# Patient Record
Sex: MALE | Race: WHITE | Employment: FULL TIME | URBAN - METROPOLITAN AREA
[De-identification: names, ages, dates, MRNs, and addresses within clinical notes are randomized per-mention and may not be internally consistent; named-entity substitution may affect disease eponyms.]

---

## 2017-01-16 PROBLEM — M23.92 INTERNAL DERANGEMENT OF KNEE, LEFT: Status: ACTIVE | Noted: 2017-01-16

## 2017-01-24 PROBLEM — S83.412A SPRAIN OF MEDIAL COLLATERAL LIGAMENT OF LEFT KNEE, INITIAL ENCOUNTER: Status: ACTIVE | Noted: 2017-01-24

## 2017-03-20 PROBLEM — S83.412D SPRAIN OF MEDIAL COLLATERAL LIGAMENT OF LEFT KNEE, SUBSEQUENT ENCOUNTER: Status: ACTIVE | Noted: 2017-03-20

## 2018-01-12 PROBLEM — M50.10 HERNIATION OF CERVICAL INTERVERTEBRAL DISC WITH RADICULOPATHY: Status: ACTIVE | Noted: 2018-01-12

## 2018-01-12 PROBLEM — S39.012D LUMBAR SPINE STRAIN, SUBSEQUENT ENCOUNTER: Status: ACTIVE | Noted: 2018-01-12
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
## 2018-03-03 PROCEDURE — 84154 ASSAY OF PSA FREE: CPT | Performed by: INTERNAL MEDICINE

## 2018-03-03 PROCEDURE — 81003 URINALYSIS AUTO W/O SCOPE: CPT | Performed by: INTERNAL MEDICINE

## 2018-03-03 PROCEDURE — 83835 ASSAY OF METANEPHRINES: CPT | Performed by: INTERNAL MEDICINE

## 2018-03-03 PROCEDURE — 84153 ASSAY OF PSA TOTAL: CPT | Performed by: INTERNAL MEDICINE
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
## 2018-06-08 PROBLEM — M47.812 OSTEOARTHRITIS OF CERVICAL SPINE, UNSPECIFIED SPINAL OSTEOARTHRITIS COMPLICATION STATUS: Status: ACTIVE | Noted: 2018-06-08

## 2018-06-08 PROBLEM — M54.12 CERVICAL RADICULITIS: Status: ACTIVE | Noted: 2018-06-08

## 2018-06-08 PROBLEM — M48.02 FORAMINAL STENOSIS OF CERVICAL REGION: Status: ACTIVE | Noted: 2018-06-08
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
## 2018-06-08 PROBLEM — M50.30 DDD (DEGENERATIVE DISC DISEASE), CERVICAL: Status: ACTIVE | Noted: 2018-06-08
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
## 2018-07-25 PROCEDURE — 88305 TISSUE EXAM BY PATHOLOGIST: CPT | Performed by: INTERNAL MEDICINE

## 2018-11-21 PROBLEM — S86.112A: Status: ACTIVE | Noted: 2018-11-21

## 2018-11-21 PROBLEM — M79.662 PAIN OF LEFT CALF: Status: ACTIVE | Noted: 2018-11-21

## 2018-12-13 ENCOUNTER — HOSPITAL ENCOUNTER (EMERGENCY)
Facility: HOSPITAL | Age: 43
Discharge: HOME | End: 2018-12-13
Attending: EMERGENCY MEDICINE
Payer: COMMERCIAL

## 2018-12-13 ENCOUNTER — APPOINTMENT (OUTPATIENT)
Dept: CT IMAGING | Facility: HOSPITAL | Age: 43
End: 2018-12-13
Attending: PHYSICIAN ASSISTANT
Payer: COMMERCIAL

## 2018-12-13 VITALS
RESPIRATION RATE: 16 BRPM | SYSTOLIC BLOOD PRESSURE: 118 MMHG | OXYGEN SATURATION: 97 % | DIASTOLIC BLOOD PRESSURE: 89 MMHG | HEART RATE: 67 BPM | TEMPERATURE: 98 F

## 2018-12-13 DIAGNOSIS — R51.9 HEADACHE DISORDER: Primary | ICD-10-CM

## 2018-12-13 DIAGNOSIS — E04.1 THYROID NODULE: ICD-10-CM

## 2018-12-13 DIAGNOSIS — R91.8 GROUND GLASS OPACITY PRESENT ON IMAGING OF LUNG: ICD-10-CM

## 2018-12-13 PROCEDURE — 96374 THER/PROPH/DIAG INJ IV PUSH: CPT

## 2018-12-13 PROCEDURE — 85025 COMPLETE CBC W/AUTO DIFF WBC: CPT | Performed by: PHYSICIAN ASSISTANT

## 2018-12-13 PROCEDURE — 99285 EMERGENCY DEPT VISIT HI MDM: CPT

## 2018-12-13 PROCEDURE — 80053 COMPREHEN METABOLIC PANEL: CPT | Performed by: PHYSICIAN ASSISTANT

## 2018-12-13 PROCEDURE — 96361 HYDRATE IV INFUSION ADD-ON: CPT

## 2018-12-13 PROCEDURE — 96375 TX/PRO/DX INJ NEW DRUG ADDON: CPT

## 2018-12-13 PROCEDURE — 70496 CT ANGIOGRAPHY HEAD: CPT | Performed by: PHYSICIAN ASSISTANT

## 2018-12-13 PROCEDURE — 70498 CT ANGIOGRAPHY NECK: CPT | Performed by: PHYSICIAN ASSISTANT

## 2018-12-13 RX ORDER — DIPHENHYDRAMINE HYDROCHLORIDE 50 MG/ML
25 INJECTION INTRAMUSCULAR; INTRAVENOUS ONCE
Status: COMPLETED | OUTPATIENT
Start: 2018-12-13 | End: 2018-12-13

## 2018-12-13 RX ORDER — SODIUM CHLORIDE 9 MG/ML
1000 INJECTION, SOLUTION INTRAVENOUS ONCE
Status: COMPLETED | OUTPATIENT
Start: 2018-12-13 | End: 2018-12-13

## 2018-12-13 RX ORDER — METHYLPREDNISOLONE 4 MG/1
TABLET ORAL
Qty: 1 PACKAGE | Refills: 0 | Status: SHIPPED | OUTPATIENT
Start: 2018-12-13 | End: 2019-01-03

## 2018-12-13 RX ORDER — METOCLOPRAMIDE HYDROCHLORIDE 5 MG/ML
10 INJECTION INTRAMUSCULAR; INTRAVENOUS ONCE
Status: COMPLETED | OUTPATIENT
Start: 2018-12-13 | End: 2018-12-13

## 2018-12-13 RX ORDER — DEXAMETHASONE SODIUM PHOSPHATE 4 MG/ML
10 VIAL (ML) INJECTION ONCE
Status: COMPLETED | OUTPATIENT
Start: 2018-12-13 | End: 2018-12-13

## 2018-12-13 RX ORDER — TRAMADOL HYDROCHLORIDE 50 MG/1
50 TABLET ORAL EVERY 4 HOURS PRN
Qty: 10 TABLET | Refills: 0 | Status: SHIPPED | OUTPATIENT
Start: 2018-12-13 | End: 2018-12-20

## 2018-12-13 NOTE — ED INITIAL ASSESSMENT (HPI)
Pt here with c/o headache, left occipital, pressure worsening since Saturday, pain now radiating to left temporal and behind left eye. States he is also experiencing fatigue and dizziness. Denies any injury or trauma.  Denies any visual disturbances or gait stretcher

## 2018-12-13 NOTE — ED PROVIDER NOTES
Patient Seen in: [facility] Emergency Department    History   Patient presents with:  Headache (neurologic)  Dizziness (neurologic)    Stated Complaint: Lt sided headache, dizziness since Sunday    DASIA  Rama Graves is a 52-year-old male who presents for e HISTORY  6/7/12    flow /s, Dr. Regina Thompson           Social History    Tobacco Use      Smoking status: Current Every Day Smoker        Packs/day: 0.50        Years: 15.00        Pack years: 7.5      Smokeless tobacco: Current User      Tobacco comment: in th following orders were created for panel order CBC WITH DIFFERENTIAL WITH PLATELET.   Procedure                               Abnormality         Status                     ---------                               -----------         ------ air cells. Mastoid airspaces appear clear. CTA neck:  3 vessel origin of the aortic arch with separate origins of the right brachiocephalic, left common carotid and left subclavian artery.   No evidence of hemodynamically significant stenosis at either ca lumbar puncture to rule out subarachnoid hemorrhage but he declines after expressing understanding of the risks, benefits and alternatives.     MDM   Clinical impression: Headache disorder, thyroid nodule, ground glass opacity on lung  Plan: Patient is info

## 2019-08-28 PROCEDURE — 88305 TISSUE EXAM BY PATHOLOGIST: CPT | Performed by: INTERNAL MEDICINE

## 2019-09-22 PROCEDURE — 83519 RIA NONANTIBODY: CPT | Performed by: OTHER

## 2019-09-22 PROCEDURE — 83516 IMMUNOASSAY NONANTIBODY: CPT | Performed by: OTHER

## 2019-09-22 PROCEDURE — 84238 ASSAY NONENDOCRINE RECEPTOR: CPT | Performed by: OTHER

## 2019-09-22 PROCEDURE — 86255 FLUORESCENT ANTIBODY SCREEN: CPT | Performed by: OTHER

## 2019-09-22 PROCEDURE — 36415 COLL VENOUS BLD VENIPUNCTURE: CPT | Performed by: OTHER

## 2019-10-11 ENCOUNTER — HOSPITAL ENCOUNTER (OUTPATIENT)
Dept: GENERAL RADIOLOGY | Facility: HOSPITAL | Age: 44
Discharge: HOME | End: 2019-10-11
Attending: INTERNAL MEDICINE
Payer: COMMERCIAL

## 2019-10-11 DIAGNOSIS — R13.10 DYSPHAGIA, UNSPECIFIED TYPE: ICD-10-CM

## 2019-10-11 DIAGNOSIS — K21.9 GASTROESOPHAGEAL REFLUX DISEASE WITHOUT ESOPHAGITIS: ICD-10-CM

## 2019-10-11 PROCEDURE — 74230 X-RAY XM SWLNG FUNCJ C+: CPT | Performed by: INTERNAL MEDICINE

## 2019-10-11 PROCEDURE — 92611 MOTION FLUOROSCOPY/SWALLOW: CPT

## 2019-10-11 NOTE — PROGRESS NOTES
ADULT VIDEOFLUOROSCOPIC SWALLOWING STUDY    Admission Date: 10/11/2019  Evaluation Date: 10/11/19  Radiologist: Dr. Sarina Gaspar    Overall Impression: Pt arrives to xray dept with c/o 2 month history of swallowing difficulty and 20# weight loss.   Pt c/o globus se additional  findings and correlate clinically.     Family/Patient Goals:      ASSESSMENT   DYSPHAGIA ASSESSMENT  Test completed in conjunction with Radiologist to assess oropharyngeal swallow function and assess for compensatory strategies to improve safe s

## 2019-11-04 NOTE — PROGRESS NOTES
Patient notified. Suspect sx related to swallowing difficulties related to cervical pathology.  Advised f/u with Dr. Pearl Mejia surgeon/specialist.

## 2019-12-11 PROBLEM — R13.13 PHARYNGEAL DYSPHAGIA: Status: ACTIVE | Noted: 2019-12-11

## 2020-02-05 ENCOUNTER — HOSPITAL (OUTPATIENT)
Dept: OTHER | Age: 45
End: 2020-02-05
Attending: INTERNAL MEDICINE

## 2020-02-05 ENCOUNTER — HOSPITAL (OUTPATIENT)
Dept: OTHER | Age: 45
End: 2020-02-05

## 2020-02-07 LAB — PATHOLOGIST NAME: NORMAL

## 2020-07-22 ENCOUNTER — LAB ENCOUNTER (OUTPATIENT)
Dept: LAB | Facility: HOSPITAL | Age: 45
End: 2020-07-22
Attending: INTERNAL MEDICINE
Payer: COMMERCIAL

## 2020-07-22 DIAGNOSIS — Z01.818 OTHER SPECIFIED PRE-OPERATIVE EXAMINATION: ICD-10-CM

## 2020-07-22 DIAGNOSIS — Z11.59 ENCOUNTER FOR SCREENING FOR OTHER VIRAL DISEASES: ICD-10-CM

## 2020-07-23 LAB — SARS-COV-2 RNA RESP QL NAA+PROBE: NOT DETECTED

## 2020-07-25 ENCOUNTER — HOSPITAL ENCOUNTER (OUTPATIENT)
Dept: GENERAL RADIOLOGY | Facility: HOSPITAL | Age: 45
Discharge: HOME | End: 2020-07-25
Attending: INTERNAL MEDICINE
Payer: COMMERCIAL

## 2020-07-25 DIAGNOSIS — R13.12 DYSPHAGIA, OROPHARYNGEAL: ICD-10-CM

## 2020-07-25 PROCEDURE — 74221 X-RAY XM ESOPHAGUS 2CNTRST: CPT | Performed by: INTERNAL MEDICINE

## 2020-08-18 NOTE — PROGRESS NOTES
Suggestion of gastroesophageal reflux. No suggestion of narrowing. Left message for patient to call back.
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
## 2020-12-02 PROBLEM — M47.812 OSTEOARTHRITIS OF CERVICAL SPINE, UNSPECIFIED SPINAL OSTEOARTHRITIS COMPLICATION STATUS: Status: RESOLVED | Noted: 2018-06-08 | Resolved: 2020-12-02

## 2020-12-02 PROBLEM — S83.412D SPRAIN OF MEDIAL COLLATERAL LIGAMENT OF LEFT KNEE, SUBSEQUENT ENCOUNTER: Status: RESOLVED | Noted: 2017-03-20 | Resolved: 2020-12-02

## 2020-12-02 PROBLEM — S83.412A SPRAIN OF MEDIAL COLLATERAL LIGAMENT OF LEFT KNEE, INITIAL ENCOUNTER: Status: RESOLVED | Noted: 2017-01-24 | Resolved: 2020-12-02

## 2020-12-02 PROBLEM — S13.4XXS WHIPLASH INJURY TO NECK, SEQUELA: Status: ACTIVE | Noted: 2020-12-02

## 2020-12-02 PROBLEM — M43.12 SPONDYLOLISTHESIS OF CERVICAL REGION: Status: ACTIVE | Noted: 2020-12-02

## 2020-12-02 PROBLEM — R51.9 HEADACHE DISORDER: Status: ACTIVE | Noted: 2020-12-02

## 2020-12-02 PROBLEM — M23.92 INTERNAL DERANGEMENT OF KNEE, LEFT: Status: RESOLVED | Noted: 2017-01-16 | Resolved: 2020-12-02

## 2020-12-02 PROBLEM — S86.112A: Status: RESOLVED | Noted: 2018-11-21 | Resolved: 2020-12-02

## 2020-12-02 PROBLEM — M79.662 PAIN OF LEFT CALF: Status: RESOLVED | Noted: 2018-11-21 | Resolved: 2020-12-02

## 2020-12-02 PROBLEM — R13.19 CERVICAL DYSPHAGIA: Status: ACTIVE | Noted: 2020-12-02

## 2020-12-02 PROBLEM — Z00.00 ROUTINE GENERAL MEDICAL EXAMINATION AT A HEALTH CARE FACILITY: Status: ACTIVE | Noted: 2020-12-02

## 2020-12-17 ENCOUNTER — OFFICE VISIT (OUTPATIENT)
Dept: SURGERY | Facility: CLINIC | Age: 45
End: 2020-12-17

## 2020-12-17 VITALS
DIASTOLIC BLOOD PRESSURE: 86 MMHG | BODY MASS INDEX: 24.38 KG/M2 | HEART RATE: 63 BPM | SYSTOLIC BLOOD PRESSURE: 132 MMHG | HEIGHT: 72 IN | WEIGHT: 180 LBS

## 2020-12-17 DIAGNOSIS — M62.89 MUSCLE TIGHTNESS: ICD-10-CM

## 2020-12-17 DIAGNOSIS — R20.0 LEG NUMBNESS: ICD-10-CM

## 2020-12-17 DIAGNOSIS — R20.0 NUMBNESS AND TINGLING OF HAND: ICD-10-CM

## 2020-12-17 DIAGNOSIS — M54.50 LUMBAR PAIN: ICD-10-CM

## 2020-12-17 DIAGNOSIS — R29.898 HAND WEAKNESS: ICD-10-CM

## 2020-12-17 DIAGNOSIS — M54.12 CERVICAL RADICULOPATHY: ICD-10-CM

## 2020-12-17 DIAGNOSIS — M50.30 DDD (DEGENERATIVE DISC DISEASE), CERVICAL: ICD-10-CM

## 2020-12-17 DIAGNOSIS — R13.10 DYSPHAGIA, UNSPECIFIED TYPE: Primary | ICD-10-CM

## 2020-12-17 DIAGNOSIS — Q39.4 ESOPHAGEAL WEB: ICD-10-CM

## 2020-12-17 DIAGNOSIS — M54.2 NECK PAIN: ICD-10-CM

## 2020-12-17 DIAGNOSIS — R20.2 NUMBNESS AND TINGLING OF HAND: ICD-10-CM
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
## 2020-12-17 DIAGNOSIS — M51.36 DDD (DEGENERATIVE DISC DISEASE), LUMBAR: ICD-10-CM

## 2020-12-17 DIAGNOSIS — M54.16 LUMBAR RADICULOPATHY: ICD-10-CM

## 2020-12-17 PROCEDURE — 3079F DIAST BP 80-89 MM HG: CPT | Performed by: PHYSICIAN ASSISTANT

## 2020-12-17 PROCEDURE — 3008F BODY MASS INDEX DOCD: CPT | Performed by: PHYSICIAN ASSISTANT

## 2020-12-17 PROCEDURE — 99205 OFFICE O/P NEW HI 60 MIN: CPT | Performed by: PHYSICIAN ASSISTANT

## 2020-12-17 PROCEDURE — 3075F SYST BP GE 130 - 139MM HG: CPT | Performed by: PHYSICIAN ASSISTANT
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
## 2020-12-17 NOTE — H&P
Patient: Suzanna Angeles  Medical Record Number: [id number]  YOB: 1975  PCP: Olimpia Lopez MD    Referring Physician: Olimpia Lopez  Reason for visit: Patient presents with:  Consult: Refd by Dr. Anjum Kinney  [facility] inj cervical level. Referred to neurosurgery. Currently having continued neck pain on both sides of the neck and in the middle. Shooting arms pains on both sides. If hands up for a long period of time experiences tingling.  Dizziness after walking on the jackson • Hypertension Father    • Lipids Father    • Other (MS) Mother       Social History    Socioeconomic History      Marital status: Single      Spouse name: Not on file      Number of children: Not on file      Years of education: Not on file      Highest dry  NEURO: Awake, alert and orientated. Speech fluent, comprehension intact, answering questions appropriately. SPINE:  Gait/Coordination: Intact, non-antalgic gait.   Able to toe and heel balance bilaterally without visible weakness  Cervical Integume Normal  C5-C6: Mild disc bulge slightly eccentric to the right attenuating the ventral thecal sac but  otherwise normal.  C6-C7: Mild disc bulge slightly eccentric to the right attenuating the ventral thecal sac.   Uncovertebral and posterior facet degenera infiltrative  marrow signal changes are detected. NO obvious LEFT L4 transverse process fracture is detected on  MR. SACROILIAC JOINTS: Mild bilateral SI joint arthritis. No sacral insufficiency fractures seen.   FACET JOINTS/POSTERIOR ELEMENTS: Mild/moder compared from today's exam.    TECHNIQUE: Thick and thin barium were demonstrated and multiple fluoroscopic images were obtained.  Two 12.5 mm barium tablets were also administered. COMPARISON:  No prior studies available at the time of this dictation. contribute to mild cervical esophageal narrowing. 2.  No esophageal diverticula, mass, or discrete ulcers identified.     3.  The exam was not tailored for evaluation of the stomach, but the limited images suggests gastric wall thickening and clinically pain  3. Activity:    - PT offered, would like to hold at this time. 4. Follow up next available with Dr. Kaela De La Cruz for imaging review or call or follow up sooner or go to the ED for any new, worsening or concerning signs or symptoms     I reviewed imaging.

## 2021-01-04 ENCOUNTER — TELEPHONE (OUTPATIENT)
Dept: SURGERY | Facility: CLINIC | Age: 46
End: 2021-01-04

## 2021-01-04 DIAGNOSIS — R20.0 NUMBNESS AND TINGLING OF HAND: ICD-10-CM

## 2021-01-04 DIAGNOSIS — M62.89 MUSCLE TIGHTNESS: ICD-10-CM

## 2021-01-04 DIAGNOSIS — Q39.4 ESOPHAGEAL WEB: ICD-10-CM

## 2021-01-04 DIAGNOSIS — R29.898 HAND WEAKNESS: ICD-10-CM

## 2021-01-04 DIAGNOSIS — M54.50 LUMBAR PAIN: ICD-10-CM

## 2021-01-04 DIAGNOSIS — M54.12 CERVICAL RADICULOPATHY: ICD-10-CM

## 2021-01-04 DIAGNOSIS — R13.10 DYSPHAGIA, UNSPECIFIED TYPE: Primary | ICD-10-CM

## 2021-01-04 DIAGNOSIS — M50.30 DDD (DEGENERATIVE DISC DISEASE), CERVICAL: ICD-10-CM

## 2021-01-04 DIAGNOSIS — M54.16 LUMBAR RADICULOPATHY: ICD-10-CM

## 2021-01-04 DIAGNOSIS — R20.2 NUMBNESS AND TINGLING OF HAND: ICD-10-CM
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
## 2021-01-04 DIAGNOSIS — R20.0 LEG NUMBNESS: ICD-10-CM

## 2021-01-07 ENCOUNTER — TELEPHONE (OUTPATIENT)
Dept: SURGERY | Facility: CLINIC | Age: 46
End: 2021-01-07

## 2021-01-11 ENCOUNTER — PATIENT MESSAGE (OUTPATIENT)
Dept: SURGERY | Facility: CLINIC | Age: 46
End: 2021-01-11

## 2021-01-12 NOTE — TELEPHONE ENCOUNTER
From: [facility]  To: Estrella May PA-C  Sent: 1/11/2021 3:05 PM CST  Subject: Visit Follow-up Question    Dr. Donny Maradiaga,     I wanted to check to see if the imaging from [facility] [place], [place] had arrived yet to your office. The request was made on 1/4/21.

## 2021-02-04 ENCOUNTER — OFFICE VISIT (OUTPATIENT)
Dept: SURGERY | Facility: CLINIC | Age: 46
End: 2021-02-04

## 2021-02-04 VITALS
SYSTOLIC BLOOD PRESSURE: 122 MMHG | BODY MASS INDEX: 24.38 KG/M2 | WEIGHT: 180 LBS | HEART RATE: 68 BPM | DIASTOLIC BLOOD PRESSURE: 78 MMHG | HEIGHT: 72 IN

## 2021-02-04 DIAGNOSIS — M54.12 CERVICAL RADICULOPATHY: ICD-10-CM

## 2021-02-04 DIAGNOSIS — M47.812 CERVICAL SPONDYLOSIS: Primary | ICD-10-CM

## 2021-02-04 PROCEDURE — 3078F DIAST BP <80 MM HG: CPT | Performed by: PHYSICIAN ASSISTANT

## 2021-02-04 PROCEDURE — 3074F SYST BP LT 130 MM HG: CPT | Performed by: PHYSICIAN ASSISTANT

## 2021-02-04 PROCEDURE — 3008F BODY MASS INDEX DOCD: CPT | Performed by: PHYSICIAN ASSISTANT

## 2021-02-04 PROCEDURE — 99214 OFFICE O/P EST MOD 30 MIN: CPT | Performed by: PHYSICIAN ASSISTANT

## 2021-02-04 NOTE — PROGRESS NOTES
Patient: Anais Connolly  Medical Record Number: [id number]  YOB: 1975  PCP: Doris Willis MD    Referring Physician: Doris Willis  HISTORY OF CHIEF COMPLAINT:    Anais Connolly is a very pleasant 41year old male [facility] the cervical level. Referred to neurosurgery. Currently having continued neck pain on both sides of the neck and in the middle. Shooting arms pains on both sides. If hands up for a long period of time experiences tingling.  Dizziness after walking on the Reflexes DTRs:       Biceps    Brachioradialis    Triceps     Patellar     Ankle    Right       1+            1+             2+        2+    Left      1+           1+                2+        2+      Tests:   Test Right   (POS or NEG) Left   (POS or NEG) thyroid gland or parotid glands, but evaluation is  very limited.    No gross abnormality seen involving the visualized aspects of the cerebellum.  =====  IMPRESSION: Mild multilevel spondylotic changes of the cervical spine, but overall findings at C6-C7 stuck, but the tablet was larger in size compared from today's exam.    TECHNIQUE: Thick and thin barium were demonstrated and multiple fluoroscopic images were obtained.  Two 12.5 mm barium tablets were also administered.     COMPARISON:  No prior studies shelf cervical web.  Together, this may contribute to mild cervical esophageal narrowing. 2.  No esophageal diverticula, mass, or discrete ulcers identified.     3.  The exam was not tailored for evaluation of the stomach, but the limited images suggests

## 2021-02-12 ENCOUNTER — TELEPHONE (OUTPATIENT)
Dept: SURGERY | Facility: CLINIC | Age: 46
End: 2021-02-12

## 2021-02-12 DIAGNOSIS — M48.02 CERVICAL STENOSIS OF SPINAL CANAL: Primary | ICD-10-CM

## 2021-02-12 NOTE — TELEPHONE ENCOUNTER
[facility] imaging is requesting new cervical spine xray order to list flexion and extension views, can fax [phone]

## 2021-05-12 PROBLEM — M47.812 FACET ARTHRITIS OF CERVICAL REGION: Status: ACTIVE | Noted: 2021-05-12

## 2021-05-12 PROBLEM — M48.02 CERVICAL STENOSIS OF SPINAL CANAL: Status: ACTIVE | Noted: 2021-05-12

## 2022-05-06 ENCOUNTER — TELEPHONE (OUTPATIENT)
Dept: SURGERY | Facility: CLINIC | Age: 47
End: 2022-05-06

## 2022-05-06 NOTE — TELEPHONE ENCOUNTER
Rec'd Medical Records request from [address] [place] [address] for any and all medical and billing records. MARIANN included. Original sent to Medical records, copy sent to scanning.

## 2022-08-22 NOTE — PAT NURSING NOTE
Patient expressed doing one from his work. Instructed patient can if PCR and given instructions to bring results to day of procedure. Patient unsure if it is a pcr. RN instructed to find out but in the mean time scheduled a pcr test at [place] tomorrow at 1630. Patient understood and repeated back the instructions.

## 2022-08-23 ENCOUNTER — LAB ENCOUNTER (OUTPATIENT)
Dept: LAB | Facility: HOSPITAL | Age: 47
End: 2022-08-23
Attending: INTERNAL MEDICINE
Payer: COMMERCIAL

## 2022-08-23 DIAGNOSIS — Z01.818 PRE-OP TESTING: ICD-10-CM

## 2022-08-24 LAB — SARS-COV-2 RNA RESP QL NAA+PROBE: NOT DETECTED

## 2022-08-26 ENCOUNTER — ANESTHESIA EVENT (OUTPATIENT)
Dept: ENDOSCOPY | Facility: HOSPITAL | Age: 47
End: 2022-08-26
Payer: COMMERCIAL

## 2022-08-26 ENCOUNTER — ANESTHESIA (OUTPATIENT)
Dept: ENDOSCOPY | Facility: HOSPITAL | Age: 47
End: 2022-08-26
Payer: COMMERCIAL

## 2022-08-26 ENCOUNTER — HOSPITAL ENCOUNTER (OUTPATIENT)
Facility: HOSPITAL | Age: 47
Setting detail: HOSPITAL OUTPATIENT SURGERY
Discharge: HOME | End: 2022-08-26
Attending: INTERNAL MEDICINE | Admitting: INTERNAL MEDICINE
Payer: COMMERCIAL

## 2022-08-26 VITALS
OXYGEN SATURATION: 100 % | HEART RATE: 90 BPM | TEMPERATURE: 98 F | RESPIRATION RATE: 20 BRPM | DIASTOLIC BLOOD PRESSURE: 85 MMHG | WEIGHT: 168 LBS | BODY MASS INDEX: 22.75 KG/M2 | HEIGHT: 72 IN | SYSTOLIC BLOOD PRESSURE: 118 MMHG

## 2022-08-26 DIAGNOSIS — R13.19 ESOPHAGEAL DYSPHAGIA: ICD-10-CM

## 2022-08-26 DIAGNOSIS — K22.70 BARRETT'S ESOPHAGUS WITHOUT DYSPLASIA: ICD-10-CM

## 2022-08-26 DIAGNOSIS — K52.9 CHRONIC DIARRHEA: ICD-10-CM

## 2022-08-26 DIAGNOSIS — Z86.010 HISTORY OF ADENOMATOUS POLYP OF COLON: ICD-10-CM

## 2022-08-26 DIAGNOSIS — Z01.818 PRE-OP TESTING: Primary | ICD-10-CM

## 2022-08-26 PROCEDURE — 0DB48ZX EXCISION OF ESOPHAGOGASTRIC JUNCTION, VIA NATURAL OR ARTIFICIAL OPENING ENDOSCOPIC, DIAGNOSTIC: ICD-10-PCS | Performed by: INTERNAL MEDICINE

## 2022-08-26 PROCEDURE — 0DBM8ZX EXCISION OF DESCENDING COLON, VIA NATURAL OR ARTIFICIAL OPENING ENDOSCOPIC, DIAGNOSTIC: ICD-10-PCS | Performed by: INTERNAL MEDICINE

## 2022-08-26 PROCEDURE — 88305 TISSUE EXAM BY PATHOLOGIST: CPT | Performed by: INTERNAL MEDICINE

## 2022-08-26 PROCEDURE — 0DB98ZX EXCISION OF DUODENUM, VIA NATURAL OR ARTIFICIAL OPENING ENDOSCOPIC, DIAGNOSTIC: ICD-10-PCS | Performed by: INTERNAL MEDICINE

## 2022-08-26 PROCEDURE — 88312 SPECIAL STAINS GROUP 1: CPT | Performed by: INTERNAL MEDICINE

## 2022-08-26 PROCEDURE — 0DBK8ZX EXCISION OF ASCENDING COLON, VIA NATURAL OR ARTIFICIAL OPENING ENDOSCOPIC, DIAGNOSTIC: ICD-10-PCS | Performed by: INTERNAL MEDICINE

## 2022-08-26 RX ORDER — LIDOCAINE HYDROCHLORIDE 10 MG/ML
INJECTION, SOLUTION EPIDURAL; INFILTRATION; INTRACAUDAL; PERINEURAL AS NEEDED
Status: DISCONTINUED | OUTPATIENT
Start: 2022-08-26 | End: 2022-08-26 | Stop reason: SURG

## 2022-08-26 RX ORDER — GLYCOPYRROLATE 0.2 MG/ML
INJECTION, SOLUTION INTRAMUSCULAR; INTRAVENOUS AS NEEDED
Status: DISCONTINUED | OUTPATIENT
Start: 2022-08-26 | End: 2022-08-26 | Stop reason: SURG

## 2022-08-26 RX ORDER — SODIUM CHLORIDE, SODIUM LACTATE, POTASSIUM CHLORIDE, CALCIUM CHLORIDE 600; 310; 30; 20 MG/100ML; MG/100ML; MG/100ML; MG/100ML
INJECTION, SOLUTION INTRAVENOUS CONTINUOUS
Status: DISCONTINUED | OUTPATIENT
Start: 2022-08-26 | End: 2022-08-26

## 2022-08-26 RX ORDER — MIDAZOLAM HYDROCHLORIDE 1 MG/ML
INJECTION INTRAMUSCULAR; INTRAVENOUS AS NEEDED
Status: DISCONTINUED | OUTPATIENT
Start: 2022-08-26 | End: 2022-08-26 | Stop reason: SURG

## 2022-08-26 RX ADMIN — LIDOCAINE HYDROCHLORIDE 40 MG: 10 INJECTION, SOLUTION EPIDURAL; INFILTRATION; INTRACAUDAL; PERINEURAL at 12:49:00

## 2022-08-26 RX ADMIN — SODIUM CHLORIDE, SODIUM LACTATE, POTASSIUM CHLORIDE, CALCIUM CHLORIDE: 600; 310; 30; 20 INJECTION, SOLUTION INTRAVENOUS at 12:46:00

## 2022-08-26 RX ADMIN — GLYCOPYRROLATE 0.2 MG: 0.2 INJECTION, SOLUTION INTRAMUSCULAR; INTRAVENOUS at 12:49:00

## 2022-08-26 RX ADMIN — MIDAZOLAM HYDROCHLORIDE 2 MG: 1 INJECTION INTRAMUSCULAR; INTRAVENOUS at 12:49:00

## 2022-08-26 RX ADMIN — SODIUM CHLORIDE, SODIUM LACTATE, POTASSIUM CHLORIDE, CALCIUM CHLORIDE: 600; 310; 30; 20 INJECTION, SOLUTION INTRAVENOUS at 13:11:00

## 2022-08-26 NOTE — OPERATIVE REPORT
ENDOSCOPY OPERATIVE REPORT  Patient Name: Grabiel Daly  Medical Record #: [id number]  YOB: 1975  Date of Procedure: 8/26/2022    Preoperative Diagnosis: history of short-segment Hugo's esophagus; chronic diarrhea. History of 2 cm sessile serrated cecal polyp on 2018. Last colonoscopy was in 8/2019. Postoperative Diagnosis:       1.) Small 2 cm hiatal hernia. 2.) Minimally irregular Z-line -- biopsies obtained. 3.) Mild left sided diverticulosis. 4.) Normal colon otherwise. Normal terminal ileum. 5.) Internal hemorrhoids. Procedure Performed: Esophagogastroduodenoscopy with biopsy and Colonoscopy with biopsy. Withdrawal time: 8 minutes. ASA Class: 3. Anesthesia Given: MAC. Moderate sedation time: NA. Deep sedation was provided by anesthesiologist.   Endoscopist: Renee Tran MD  Procedure: After the patient was interviewed and the procedure again discussed and questions addressed, the patient was brought to the GI Lab and monitoring of the B/P, pulse, and pulse oximetry was performed. The patient was then placed in the left lateral decubitus position and sedated with divided doses of IV medication; continuous vital signs were monitored throughout the procedure. A time-out procedure was performed, history and physical were reviewed, medical reconciliation was complete, informed consent was obtained. The Olympus videogastroscope was intubated into the esophagus and advanced into the duodenum under directed vision without difficulty. Then withdrawn. The patient was repositioned and prepared for the colonoscopy. The scope was inserted through the rectum and advanced to the cecum as identified by the appendiceal orifice and ileocecal valve. The terminal ileum was intubated and was normal.  The quality of the preparation was Aronchick score 1. A thorough exam was performed throughout the procedures. The patient tolerated the procedures well.    Aronchick Bowel Prep:  Score:  1 = Excellent (>95% mucosa seen)   2 = Good (clear liquid up to 25% of mucosa, 90% mucosa seen)   3 = fair (semisolid stool not suctioned, but 90% mucosa seen)   4 = poor (semisolid stool not suctioned, <90% mucosa seen)   5 = inadequate (repeat prep needed). FINDINGS: The esophagus mucosa had an overall normal appearance except for the Z-line which was minimally irrregular. The gastric lumen was then entered and views of the gastric mucosa as well as retroverted views of the fundus which suggested a small 2 cm hiatal hernia. A normal pylorus was passed and the duodenal bulb entered, the duodenal bulb and post-bulbar duodenum were unremarkable. Biopsies from the second part of duodenum were obtained. GE-junction biopsies obtained. Of note, there was no old or fresh blood in any examined upper GI tract. The scope was then withdrawn and the procedure terminated. A colonoscopy was then performed. Mild left sided diverticulosis. The overall visualized mucosal pattern of the colon was unremarkable. Random colon biopsies were obtained from the Right and Left colon. At the anal verge the endoscope was retroverted, internal hemorrhoids, no other abnormalities were indentified. The procedure was tolerated well and upon completion and throughtout the vital signs were stable. COMPLICATIONS: None. PLAN: Patient is to follow a high fiber, low fat diet and followup with primary care for further care. Await biopsy results. I have requested the patient to follow-up with me in the clinic. RECOMMEND REPEAT COLONOSCOPY IN 5 YEARS with MAC (monitored anesthesia care). The patient and  were informed of the endoscopic findings and was also given a copy of the findings, postoperative instructions, and postoperative precautions. Martha Lindo MD  [facility].

## 2022-08-26 NOTE — ANESTHESIA POSTPROCEDURE EVALUATION
Patient: Vanessa Arita    Procedure Summary     Date: 08/26/22 Room / Location: [facility] ENDOSCOPY 04 / [facility] ENDOSCOPY    Anesthesia Start: 2664 Anesthesia Stop: 7905    Procedures:       COLONOSCOPY/ESOPHAGOGASTRODUODENOSCOPY (EGD) (N/A )      ESOPHAGOGASTRODUODENOSCOPY (EGD) (N/A ) Diagnosis:       Hugo's esophagus without dysplasia      Esophageal dysphagia      Chronic diarrhea      History of adenomatous polyp of colon      (hiatal hernia, diverticulosis, hemorrhoids)    Surgeons: Adolfo Mcmahan MD Anesthesiologist: Valeria Tamez CRNA    Anesthesia Type: general ASA Status: 3          Anesthesia Type: general    Vitals Value Taken Time   BP 99/70 08/26/22 1315   Temp  08/26/22 1316   Pulse 88 08/26/22 1315   Resp 16 08/26/22 1315   SpO2 97 % 08/26/22 1315       [facility] AN Post Evaluation:   Patient Evaluated in PACU  Patient Participation: complete - patient participated  Level of Consciousness: sleepy but conscious  Pain Score: 0  Pain Management: adequate  Airway Patency:patent  Dental exam unchanged from preop  Yes    Cardiovascular Status: acceptable  Respiratory Status: acceptable  Postoperative Hydration acceptable      Valeria Tamez CRNA  8/26/2022 1:16 PM

## 2024-08-02 ENCOUNTER — APPOINTMENT (OUTPATIENT)
Dept: GENERAL RADIOLOGY | Facility: HOSPITAL | Age: 49
End: 2024-08-02
Payer: COMMERCIAL

## 2024-08-02 ENCOUNTER — HOSPITAL ENCOUNTER (EMERGENCY)
Facility: HOSPITAL | Age: 49
Discharge: HOME | End: 2024-08-02
Attending: EMERGENCY MEDICINE
Payer: COMMERCIAL

## 2024-08-02 VITALS
SYSTOLIC BLOOD PRESSURE: 142 MMHG | DIASTOLIC BLOOD PRESSURE: 102 MMHG | RESPIRATION RATE: 16 BRPM | HEART RATE: 83 BPM | BODY MASS INDEX: 23 KG/M2 | TEMPERATURE: 98 F | OXYGEN SATURATION: 93 % | WEIGHT: 168 LBS

## 2024-08-02 DIAGNOSIS — S20.212A CONTUSION OF LEFT CHEST WALL, INITIAL ENCOUNTER: Primary | ICD-10-CM

## 2024-08-02 LAB
ALBUMIN SERPL-MCNC: 5.2 G/DL (ref 3.2–4.8)
ALBUMIN/GLOB SERPL: 2.2 {RATIO} (ref 1–2)
ALP LIVER SERPL-CCNC: 95 U/L
ALT SERPL-CCNC: 125 U/L
ANION GAP SERPL CALC-SCNC: 11 MMOL/L (ref 0–18)
APTT PPP: 33.8 SECONDS (ref 23–36)
AST SERPL-CCNC: 117 U/L (ref ?–34)
BASOPHILS # BLD AUTO: 0.05 X10(3) UL (ref 0–0.2)
BASOPHILS NFR BLD AUTO: 0.5 %
BILIRUB SERPL-MCNC: 0.8 MG/DL (ref 0.3–1.2)
BUN BLD-MCNC: 11 MG/DL (ref 9–23)
CALCIUM BLD-MCNC: 9.8 MG/DL (ref 8.7–10.4)
CHLORIDE SERPL-SCNC: 104 MMOL/L (ref 98–112)
CO2 SERPL-SCNC: 22 MMOL/L (ref 21–32)
CREAT BLD-MCNC: 0.94 MG/DL
EGFRCR SERPLBLD CKD-EPI 2021: 101 ML/MIN/1.73M2 (ref 60–?)
EOSINOPHIL # BLD AUTO: 0.06 X10(3) UL (ref 0–0.7)
EOSINOPHIL NFR BLD AUTO: 0.6 %
ERYTHROCYTE [DISTWIDTH] IN BLOOD BY AUTOMATED COUNT: 12.5 %
GLOBULIN PLAS-MCNC: 2.4 G/DL (ref 2–3.5)
GLUCOSE BLD-MCNC: 74 MG/DL (ref 70–99)
HCT VFR BLD AUTO: 44.6 %
HGB BLD-MCNC: 16.3 G/DL
IMM GRANULOCYTES # BLD AUTO: 0.12 X10(3) UL (ref 0–1)
IMM GRANULOCYTES NFR BLD: 1.1 %
INR BLD: 0.95 (ref 0.8–1.2)
LYMPHOCYTES # BLD AUTO: 2.11 X10(3) UL (ref 1–4)
LYMPHOCYTES NFR BLD AUTO: 19.8 %
MCH RBC QN AUTO: 37.5 PG (ref 26–34)
MCHC RBC AUTO-ENTMCNC: 36.5 G/DL (ref 31–37)
MCV RBC AUTO: 102.5 FL
MONOCYTES # BLD AUTO: 0.79 X10(3) UL (ref 0.1–1)
MONOCYTES NFR BLD AUTO: 7.4 %
NEUTROPHILS # BLD AUTO: 7.52 X10 (3) UL (ref 1.5–7.7)
NEUTROPHILS # BLD AUTO: 7.52 X10(3) UL (ref 1.5–7.7)
NEUTROPHILS NFR BLD AUTO: 70.6 %
NT-PROBNP SERPL-MCNC: <35 PG/ML (ref ?–125)
OSMOLALITY SERPL CALC.SUM OF ELEC: 282 MOSM/KG (ref 275–295)
PLATELET # BLD AUTO: 176 10(3)UL (ref 150–450)
POTASSIUM SERPL-SCNC: 3.8 MMOL/L (ref 3.5–5.1)
PROT SERPL-MCNC: 7.6 G/DL (ref 5.7–8.2)
PROTHROMBIN TIME: 12.7 SECONDS (ref 11.6–14.8)
RBC # BLD AUTO: 4.35 X10(6)UL
SODIUM SERPL-SCNC: 137 MMOL/L (ref 136–145)
TROPONIN I SERPL HS-MCNC: <3 NG/L
WBC # BLD AUTO: 10.7 X10(3) UL (ref 4–11)

## 2024-08-02 PROCEDURE — 85730 THROMBOPLASTIN TIME PARTIAL: CPT | Performed by: EMERGENCY MEDICINE

## 2024-08-02 PROCEDURE — 80053 COMPREHEN METABOLIC PANEL: CPT | Performed by: EMERGENCY MEDICINE

## 2024-08-02 PROCEDURE — 83880 ASSAY OF NATRIURETIC PEPTIDE: CPT

## 2024-08-02 PROCEDURE — 85730 THROMBOPLASTIN TIME PARTIAL: CPT

## 2024-08-02 PROCEDURE — 84484 ASSAY OF TROPONIN QUANT: CPT | Performed by: EMERGENCY MEDICINE

## 2024-08-02 PROCEDURE — 93010 ELECTROCARDIOGRAM REPORT: CPT

## 2024-08-02 PROCEDURE — 71045 X-RAY EXAM CHEST 1 VIEW: CPT

## 2024-08-02 PROCEDURE — 85025 COMPLETE CBC W/AUTO DIFF WBC: CPT

## 2024-08-02 PROCEDURE — 99285 EMERGENCY DEPT VISIT HI MDM: CPT

## 2024-08-02 PROCEDURE — 85025 COMPLETE CBC W/AUTO DIFF WBC: CPT | Performed by: EMERGENCY MEDICINE

## 2024-08-02 PROCEDURE — 96374 THER/PROPH/DIAG INJ IV PUSH: CPT

## 2024-08-02 PROCEDURE — 99284 EMERGENCY DEPT VISIT MOD MDM: CPT

## 2024-08-02 PROCEDURE — 93005 ELECTROCARDIOGRAM TRACING: CPT

## 2024-08-02 PROCEDURE — 83880 ASSAY OF NATRIURETIC PEPTIDE: CPT | Performed by: EMERGENCY MEDICINE

## 2024-08-02 PROCEDURE — 80053 COMPREHEN METABOLIC PANEL: CPT

## 2024-08-02 PROCEDURE — 85610 PROTHROMBIN TIME: CPT

## 2024-08-02 PROCEDURE — 84484 ASSAY OF TROPONIN QUANT: CPT

## 2024-08-02 PROCEDURE — 85610 PROTHROMBIN TIME: CPT | Performed by: EMERGENCY MEDICINE

## 2024-08-02 RX ORDER — HYDROCODONE BITARTRATE AND ACETAMINOPHEN 5; 325 MG/1; MG/1
1-2 TABLET ORAL EVERY 6 HOURS PRN
Qty: 10 TABLET | Refills: 0 | Status: SHIPPED | OUTPATIENT
Start: 2024-08-02 | End: 2024-08-07

## 2024-08-02 RX ORDER — MORPHINE SULFATE 4 MG/ML
4 INJECTION, SOLUTION INTRAMUSCULAR; INTRAVENOUS ONCE
Status: DISCONTINUED | OUTPATIENT
Start: 2024-08-02 | End: 2024-08-02

## 2024-08-02 RX ORDER — MORPHINE SULFATE 4 MG/ML
4 INJECTION, SOLUTION INTRAMUSCULAR; INTRAVENOUS ONCE
Status: COMPLETED | OUTPATIENT
Start: 2024-08-02 | End: 2024-08-02

## 2024-08-02 NOTE — ED INITIAL ASSESSMENT (HPI)
Pt to ER c/o of cp and bere x 1 day. Pt states it is painful for him to take a deep breath and difficult to cough.

## 2024-08-03 NOTE — ED PROVIDER NOTES
Patient Seen in: [facility] Emergency Department      History     Chief Complaint   Patient presents with    Difficulty Breathing    Chest Pain Angina     Stated Complaint: CP and BOBBI    Subjective:   HPI    Patient comes to the emergency department after stumbling and striking his left anterior chest wall yesterday.  Since then, he has had increasing pain in that same area.  The pain worsens with deep inspiration.  He has no other areas of discomfort or injury.    Objective:   Past Medical History:    BPH (benign prostatic hyperplasia)    HIGH BLOOD PRESSURE    High cholesterol              Past Surgical History:   Procedure Laterality Date    Colonoscopy N/A 8/26/2022    Procedure: COLONOSCOPY/ESOPHAGOGASTRODUODENOSCOPY (EGD);  Surgeon: Venu Quezada MD;  Location: [facility] ENDOSCOPY    Colonoscopy,biopsy  3/2/09    Performed by VENU QUEZADA at [facility]    Egd N/A 7/25/2018    Procedure: ESOPHAGOGASTRODUODENOSCOPY, COLONOSCOPY, POSSIBLE BIOPSY, POSSIBLE POLYPECTOMY 14065, 07106;  Surgeon: Venu Quezada MD;  Location: [facility]    Egd N/A 8/28/2019    Procedure: ESOPHAGOGASTRODUODENOSCOPY, COLONOSCOPY, POSSIBLE BIOPSY, POSSIBLE POLYPECTOMY 18355, 42730;  Surgeon: Venu Quezada MD;  Location: [facility]    Egd  02/2020    Hugo's without dysplasia- repeat 3 yr    Other surgical history  6-9-2011    Flow  - Dr. Ireland    Other surgical history  6/7/12    flow u/s, Dr. Ireland    Upper gi endoscopy,biopsy  3/2/09    Performed by VENU QUEZADA at [facility]                No pertinent social history.            Review of Systems    Positive for stated Chief Complaint: Difficulty Breathing and Chest Pain Angina    Other systems are as noted in HPI.  Constitutional and vital signs reviewed.      All other systems reviewed and negative except as noted above.    Physical Exam     ED Triage Vitals [08/02/24 1643]   /85   Pulse 105   Resp 18   Temp 98.3 °F (36.8  °C)   Temp src    SpO2 93 %   O2 Device None (Room air)       Current Vitals:   Vital Signs  BP: (!) 142/102  Pulse: 83  Resp: 16  Temp: 98.3 °F (36.8 °C)  MAP (mmHg): (!) 111    Oxygen Therapy  SpO2: 93 %  O2 Device: None (Room air)            Physical Exam  Vitals and nursing note reviewed.   Constitutional:       Appearance: He is well-developed.   HENT:      Head: Normocephalic.   Cardiovascular:      Rate and Rhythm: Normal rate and regular rhythm.      Heart sounds: Normal heart sounds. No murmur heard.  Pulmonary:      Effort: Pulmonary effort is normal.      Breath sounds: Normal breath sounds.   Chest:      Chest wall: Tenderness present.      Comments: Patient has marked tenderness over the left anterior lateral chest wall.  No crepitation or subcutaneous emphysema noted.  Abdominal:      General: Bowel sounds are normal.      Palpations: Abdomen is soft.      Tenderness: There is no abdominal tenderness. There is no guarding.   Musculoskeletal:         General: No tenderness. Normal range of motion.      Cervical back: Normal range of motion and neck supple.   Lymphadenopathy:      Cervical: No cervical adenopathy.   Skin:     General: Skin is warm and dry.      Findings: No rash.   Neurological:      Mental Status: He is alert and oriented to person, place, and time.      Sensory: No sensory deficit.              ED Course     Labs Reviewed   COMP METABOLIC PANEL (14) - Abnormal; Notable for the following components:       Result Value     (*)      (*)     Albumin 5.2 (*)     A/G Ratio 2.2 (*)     All other components within normal limits   CBC W/ DIFFERENTIAL - Abnormal; Notable for the following components:    .5 (*)     MCH 37.5 (*)     All other components within normal limits   TROPONIN I HIGH SENSITIVITY - Normal   PRO BETA NATRIURETIC PEPTIDE - Normal   PROTHROMBIN TIME (PT) - Normal   PTT, ACTIVATED - Normal   CBC WITH DIFFERENTIAL WITH PLATELET    Narrative:     The  following orders were created for panel order CBC With Differential With Platelet.  Procedure                               Abnormality         Status                     ---------                               -----------         ------                     CBC W/ DIFFERENTIAL[[id number]]          Abnormal            Final result                 Please view results for these tests on the individual orders.   RAINBOW DRAW LAVENDER   RAINBOW DRAW LIGHT GREEN   RAINBOW DRAW BLUE   RAINBOW DRAW GOLD     EKG    Rate, intervals and axes as noted on EKG Report.  Rate: 101  Rhythm: Sinus Rhythm  Reading: Sinus tachycardia, otherwise normal.                 Patient's chemistries, CBC and troponin were negative.         MDM      Patient comes to the emergency department with acute left-sided chest pain that seems to have been precipitated by direct trauma and fall on the affected area.  Patient's differential diagnose include acute rib fracture, pneumothorax, acute myocardial pathology.  Patient's workup was unremarkable and patient was treated with morphine for pain for his chest wall contusion.  Patient was discharged home with instructions to follow-up with primary care physician and to return immediately for any acute change or worsening of symptoms.  Prescription for Norco was also written for pain control.                                   Medical Decision Making      Disposition and Plan     Clinical Impression:  1. Contusion of left chest wall, initial encounter         Disposition:  Discharge  8/2/2024  8:11 pm    Follow-up:  Tamara Wade MD  [address] [address]  [phone]    Call in 3 day(s)            Medications Prescribed:  Discharge Medication List as of 8/2/2024  8:39 PM        START taking these medications    Details   HYDROcodone-acetaminophen 5-325 MG Oral Tab Take 1-2 tablets by mouth every 6 (six) hours as needed for Pain., Normal, Disp-10 tablet, R-0

## 2024-08-05 LAB
ATRIAL RATE: 101 BPM
P AXIS: 51 DEGREES
P-R INTERVAL: 144 MS
Q-T INTERVAL: 358 MS
QRS DURATION: 80 MS
QTC CALCULATION (BEZET): 464 MS
R AXIS: 51 DEGREES
T AXIS: 57 DEGREES
VENTRICULAR RATE: 101 BPM

## (undated) DEVICE — 35 ML SYRINGE REGULAR TIP: Brand: MONOJECT

## (undated) DEVICE — MEDI-VAC NON-CONDUCTIVE SUCTION TUBING 6MM X 1.8M (6FT.) L: Brand: CARDINAL HEALTH

## (undated) DEVICE — 3 ML SYRINGE LUER-LOCK TIP: Brand: MONOJECT

## (undated) DEVICE — 6 ML SYRINGE LUER-LOCK TIP: Brand: MONOJECT

## (undated) DEVICE — LINE MNTR ADLT SET O2 INTMD

## (undated) DEVICE — YANKAUER SUCTION INSTRUMENT NO CONTROL VENT, BULB TIP, CLEAR: Brand: YANKAUER

## (undated) DEVICE — KIT CLEAN ENDOKIT 1.1OZ GOWNX2

## (undated) DEVICE — KIT ENDO ORCAPOD 160/180/190

## (undated) DEVICE — CONMED SCOPE SAVER BITE BLOCK, 20X27 MM: Brand: SCOPE SAVER

## (undated) DEVICE — FORCEP RADIAL JAW 4

## (undated) NOTE — ED AVS SNAPSHOT
Parminder Baxter   MRN: [id number]    Department:  [facility] Emergency Department   Date of Visit:  12/13/2018           Disclosure     Insurance plans vary and the physician(s) referred by the ER may not be covered by your plan.  Please contact y tell this physician (or your personal doctor if your instructions are to return to your personal doctor) about any new or lasting problems. The primary care or specialist physician will see patients referred from the [facility] Emergency Department.  Shelton Lombard

## (undated) NOTE — LETTER
[address] [place] [address]      Authorization for Surgical Operation and Procedure     Date:___________                                                                                                         Time:__________  1. I hereby Wilner Flores MD, my physician and his/her assistants (if applicable), which may include medical students, residents, and/or fellows, to perform the following surgical operation/ procedure and administer such anesthesia as may be determined necessary by my physician:  Operation/Procedure name (s) COLONOSCOPY/ESOPHAGOGASTRODUODENOSCOPY (EGD)  on Noe Watt   2. I recognize that during the surgical operation/procedure, unforeseen conditions may necessitate additional or different procedures than those listed above. I, therefore, further authorize and request that the above-named surgeon, assistants, or designees perform such procedures as are, in their judgment, necessary and desirable. 3.   My surgeon/physician has discussed prior to my surgery the potential benefits, risks and side effects of this procedure; the likelihood of achieving goals; and potential problems that might occur during recuperation. They also discussed reasonable alternatives to the procedure, including risks, benefits, and side effects related to the alternatives and risks related to not receiving this procedure. I have had all my questions answered and I acknowledge that no guarantee has been made as to the result that may be obtained. 4.   Should the need arise during my operation or immediate post-operative period, I also consent to the administration of blood and/or blood products. Further, I understand that despite careful testing and screening of blood or blood products by collecting agencies, I may still be subject to ill effects as a result of receiving a blood transfusion and/or blood products.   The following are some, but not all, of the potential risks that can occur: fever and allergic reactions, hemolytic reactions, transmission of diseases such as Hepatitis, AIDS and Cytomegalovirus (CMV) and fluid overload. In the event that I wish to have an autologous transfusion of my own blood, or a directed donor transfusion. I will discuss this with my physician. 5.   I authorize the use of any specimen, organs, tissues, body parts or foreign objects that may be removed from my body during the operation/procedure for diagnosis, research or teaching purposes and their subsequent disposal by hospital authorities. I also authorize the release of specimen test results and/or written reports to my treating physician on the hospital medical staff or other referring or consulting physicians involved in my care, at the discretion of the Pathologist or my treating physician. 6.   I consent to the photographing or videotaping of the operations or procedures to be performed, including appropriate portions of my body for medical, scientific, or educational purposes, provided my identity is not revealed by the pictures or by descriptive texts accompanying them. If the procedure has been photographed/videotaped, the surgeon will obtain the original picture, image, videotape or CD. The hospital will not be responsible for storage, release or maintenance of the picture, image, tape or CD.    7.   I consent to the presence of a  or observers in the operating room as deemed necessary by my physician or their designees. 8.   I recognize that in the event my procedure results in extended X-Ray/fluoroscopy time, I may develop a skin reaction. 9. If I have a Do Not Attempt Resuscitation (DNAR) order in place, that status will be suspended while in the operating room, procedural suite, and during the recovery period unless otherwise explicitly stated by me (or a person authorized to consent on my behalf).  The surgeon or my attending physician will determine when the applicable recovery period ends for purposes of reinstating the DNAR order. 10. Patients having a sterilization procedure: I understand that if the procedure is successful the results will be permanent and it will therefore be impossible for me to inseminate, conceive, or bear children. I also understand that the procedure is intended to result in sterility, although the result has not been guaranteed. 11. I acknowledge that my physician has explained sedation/analgesia administration to me including the risk and benefits I consent to the administration of sedation/analgesia as may be necessary or desirable in the judgment of my physician. I CERTIFY THAT I HAVE READ AND FULLY UNDERSTAND THE ABOVE CONSENT TO OPERATION and/or OTHER PROCEDURE.    _________________________________________  __________________________________  Signature of Patient     Signature of Responsible Person         ___________________________________         Printed Name of Responsible Person           _________________________________                  Relationship to Patient  _________________________________________  ______________________________  Signature of Witness          Date  Time    STATEMENT OF PHYSICIAN My signature below affirms that prior to the time of the procedure; I have explained to the patient and/or his/her legal representative, the risks and benefits involved in the proposed treatment and any reasonable alternative to the proposed treatment. I have also explained the risks and benefits involved in refusal of the proposed treatment and alternatives to the proposed treatment and have answered the patient's questions. If I have a significant financial interest in a co-management agreement or a significant financial interest in any product or implant, or other significant relationship used in this procedure/surgery, I have disclosed this and had a discussion with my patient. _______________________________________________________________ _____________________________  Kourtney Willis)                                                                                         (Date)                                   (Time)        Patient Name: Matt Arce    : 1975   Printed: 2022      Medical Record #: [id number]                                              Page 1